# Patient Record
Sex: MALE | Race: WHITE | Employment: OTHER | URBAN - METROPOLITAN AREA
[De-identification: names, ages, dates, MRNs, and addresses within clinical notes are randomized per-mention and may not be internally consistent; named-entity substitution may affect disease eponyms.]

---

## 2017-03-08 ENCOUNTER — HOSPITAL ENCOUNTER (OUTPATIENT)
Dept: PHYSICAL THERAPY | Age: 72
Discharge: HOME | End: 2017-03-08
Payer: MEDICARE

## 2017-03-08 PROCEDURE — G8978 MOBILITY CURRENT STATUS: HCPCS

## 2017-03-08 PROCEDURE — G8979 MOBILITY GOAL STATUS: HCPCS

## 2017-03-08 PROCEDURE — 97162 PT EVAL MOD COMPLEX 30 MIN: CPT

## 2017-03-08 PROCEDURE — 97110 THERAPEUTIC EXERCISES: CPT

## 2017-03-08 NOTE — PROGRESS NOTES
Ambulatory/Rehab Services H2 Model Falls Risk Assessment    Risk Factor Pts. ·   Confusion/Disorientation/Impulsivity  []    4 ·   Symptomatic Depression  []   2 ·   Altered Elimination  []   1 ·   Dizziness/Vertigo  []   1 ·   Gender (Male)  [x]   1 ·   Any administered antiepileptics (anticonvulsants):  []   2 ·   Any administered benzodiazepines:  []   1 ·   Visual Impairment (specify):  []   1 ·   Portable Oxygen Use  []   1 ·   Orthostatic ? BP  []   1 ·   History of Recent Falls (within 3 mos.)  []   5     Ability to Rise from Chair (choose one) Pts. ·   Ability to rise in a single movement  [x]   0 ·   Pushes up, successful in one attempt  []   1 ·   Multiple attempts, but successful  []   3 ·   Unable to rise without assistance  []   4   Total: (5 or greater = High Risk) 1     Falls Prevention Plan:   []                Physical Limitations to Exercise (specify):   []                Mobility Assistance Device (type):   []                Exercise/Equipment Adaptation (specify):    ©2010 [facility] [place] Patent #[id number].  Federal Law prohibits the replication, distribution or use without written permission from [facility] [employer]

## 2017-03-08 NOTE — PROGRESS NOTES
Nicolas Denson. : 1945 [id number] [facility],[address]  [address] [place] [address]  [phone]   [phone]        OUTPATIENT PHYSICAL THERAPY:Initial Assessment 3/8/2017        ICD-10: Treatment Diagnosis: Pain in left hip (M25.552); Precautions/Allergies:   Tamsulosin   Fall Risk Score: 1 (? 5 = High Risk)  MD Orders: evaluate and treat MEDICAL/REFERRING DIAGNOSIS:  Pain in left hip [M25.552]   DATE OF ONSET: 2 month history  REFERRING PHYSICIAN: Vargas Piedra MD  RETURN PHYSICIAN APPOINTMENT: to be determined     INITIAL ASSESSMENT:  Mr. Bahman Pratt presents to therapy with left posterior hip pain secondary to insertional tendinitis of the hamstring musculature. He has good mobility of the hip joint and good flexibility of the hamstring musculature however weakness is present through the hamstring musculature contributing to difficulty with mobility, specifically with going up stairs. He also demonstrates weakness throughout the gluteal musculature leading to increased strain along the insertion of the hamstring musculature. Skilled therapy is required to return to prior level of function. PROBLEM LIST (Impacting functional limitations):  1. Decreased Strength  2. Decreased Ambulation Ability/Technique  3. Increased Pain  4. Decreased Activity Tolerance  5. Decreased Flexibility/Joint Mobility INTERVENTIONS PLANNED:  1. Balance Exercise  2. Cryotherapy  3. Heat  4. Home Exercise Program (HEP)  5. Manual Therapy  6. Neuromuscular Re-education/Strengthening  7. Range of Motion (ROM)  8. Therapeutic Activites  9. Therapeutic Exercise/Strengthening  10. modalities   TREATMENT PLAN:  Effective Dates: 17 TO 17. Frequency/Duration: 1 time a week for 4 weeks  GOALS: (Goals have been discussed and agreed upon with patient.)  Short-Term Functional Goals: Time Frame: 2 weeks  1. Patient will be independent with HEP.    2. Patient will be able to sit for 10 minutes without pain. Discharge Goals: Time Frame: 4 weeks  1. Patient will be able to go up 1 flight of stairs without symptoms. 2. Patient will demonstrate 5/5 hip extensor strength to improve pelvic stability and decrease strain at the hamstring musculature. 3. Patient will be able to sit for >30 minutes without pain to return to prior level of function. Rehabilitation Potential For Stated Goals: Excellent  Regarding Vandana Larsen JrNaa's therapy, I certify that the treatment plan above will be carried out by a therapist or under their direction. Thank you for this referral,  Lily Gonzalez DPT       Referring Physician Signature: Yael Piedra MD              Date                      HISTORY:   History of Present Injury/Illness (Reason for Referral):  Patient presents to therapy with 2 month history of left posterior hip pain along the insertion of the hamstring tendon. Denies any specific mechanism of injury, denies numbness or tingling. Does note loss of strength through the leg as symptoms are particularly irritated with going up stairs. He also notes pain with sitting >5 minutes having to frequently readjust to get comfortable. Denies pain with standing or walking and is able to walk indefinitely without issues. Also notes no difficulty with prone or supine lying. He has been taking Mobic over the past 3 days which have moderately relieved his pain. Has no difficulty with activities such as getting up and down from the floor or bending forward. Past Medical History/Comorbidities:   Mr. Radha Murcia  has a past medical history of Claustrophobia; COPD (2008); Elevated prostate specific antigen (PSA); Enlarged prostate; FHx: hypertension; GERD (gastroesophageal reflux disease); Hypertension; Hypertrophy of prostate with urinary obstruction and other lower urinary tract symptoms (LUTS);  Hypertrophy of prostate without urinary obstruction and other lower urinary tract symptoms (LUTS); and Obesity (BMI 30-39.9). Mr. Aimee Willis  has a past surgical history that includes cyst removal; bunionectomy; prostate biopsy, needle, saturation sampling; shoulder replacement (2010); shoulder arthroscopy; orthopaedic (2006); mohs procedure (Left); and refractive surgery. Social History/Living Environment:     Patient is retired and lives at home with his family. Prior Level of Function/Work/Activity:  Patient is retired. Dominant Side:         RIGHT  Current Medications:    Current Outpatient Prescriptions:     lisinopril (PRINIVIL, ZESTRIL) 10 mg tablet, Take 10 mg by mouth daily. , Disp: , Rfl:     HYDROcodone-acetaminophen (LORTAB) 0.5-33.3 mg/mL Soln oral solution, Take 15 mL by mouth every six (6) hours as needed. , Disp: 240 mL, Rfl: 0    omeprazole (PRILOSEC) 20 mg capsule, Take 20 mg by mouth every morning. Indications: GASTROESOPHAGEAL REFLUX, Disp: , Rfl:     lisinopril-hydrochlorothiazide (PRINZIDE, ZESTORETIC) 20-12.5 mg per tablet, Take 1 Tab by mouth every morning. Indications: HYPERTENSION, Disp: , Rfl:     dutasteride (AVODART) 0.5 mg capsule, Take 0.5 mg by mouth every morning., Disp: , Rfl:     albuterol (PROVENTIL HFA) 90 mcg/actuation inhaler, Take 1 Puff by inhalation as needed. Has not used in 1 year per pt- 8/19/13  Indications: CHRONIC OBSTRUCTIVE PULMONARY DISEASE, instructed to bring day of surgery, Disp: , Rfl:     ZINC MTH/COPPER/SAW PALM/GNSG (PROSTATE HEALTH FORMULA PO), Take 1 Tab by mouth every morning., Disp: , Rfl:     cholecalciferol, vitamin D3, (VITAMIN D3) 2,000 unit Tab, Take 1 Tab by mouth every morning., Disp: , Rfl:     coenzyme q10-vitamin e (COQ10 ) 100-100 mg-unit cap, Take 1 Tab by mouth every morning. Indications: held for surgery- last dose 8/19/13, Disp: , Rfl:     aspirin 81 mg Tab, Take 81 mg by mouth every morning.  Indications: preventative- held for surgery- last dose 8/19/13, Disp: , Rfl:     Glucosamine &Chondroit-MV-Min3 486-652-83-0.5 mg Tab, Take 1 Tab by mouth every morning. Indications: held for surgery- last dose 8/19/13, Disp: , Rfl:     omega-3 fatty acids-vitamin e (FISH OIL) 1,000 mg Cap, Take 1 Cap by mouth every morning. Indications: held for surgery- last dose 8/19/13, Disp: , Rfl:     ascorbic acid (VITAMIN C) 1,000 mg tablet, Take 1,000 mg by mouth every morning., Disp: , Rfl:     MULTIVITAMINS (MULTIVITAMIN PO), Take 1 Tab by mouth every morning. Indications: held for surgery- last dose 8/19/13, Disp: , Rfl:    Date Last Reviewed:  3/8/2017   # of Personal Factors/Comorbidities that affect the Plan of Care: 1-2: MODERATE COMPLEXITY   EXAMINATION:   Observation/Orthostatic Postural Assessment:          No gross deformity or atrophy is noted. General atrophy is present through the glutes bilaterally. Palpation:          Tender to hamstring tendon insertion. No tenderness to piriformis or lumbar spine  ROM:     Hip IR: 30 ° B with no provocation  Hip ER: 50 ° B  SLR: 90 ° B  90/90  Hamstring limited 20 ° B with no provocation  Piriformis length is normal  Hip extension: 10 ° B       Strength:     Knee extension: 5/5 B  Knee flexion: 4+/5 L; 5/5 R  Ankle DF: 5/5 B  Hip extension: 4-/5 L; 5/5 R  Glute strength: 4-/5 L; 5/5 R      Special Tests:          Neural tension is negative; Bridge with kick out is provocative to the L  Neurological Screen:        Grossly intact. Functional Mobility:         independent  Balance: WNL   Body Structures Involved:  1. Nerves  2. Bones  3. Joints  4. Muscles  5. Ligaments Body Functions Affected:  1. Sensory/Pain  2. Neuromusculoskeletal  3. Movement Related Activities and Participation Affected:  1. General Tasks and Demands  2. Mobility  3. Self Care  4. Domestic Life  5.  Interpersonal Interactions and Relationships   # of elements that affect the Plan of Care: 3: MODERATE COMPLEXITY   CLINICAL PRESENTATION:   Presentation: Evolving clinical presentation with changing clinical characteristics: MODERATE COMPLEXITY   CLINICAL DECISION MAKING:   Outcome Measure: Tool Used: Lower Extremity Functional Scale (LEFS)  Score:  Initial: 67/80 Most Recent: X/80 (Date: -- )   Interpretation of Score: 20 questions each scored on a 5 point scale with 0 representing \"extreme difficulty or unable to perform\" and 4 representing \"no difficulty\". The lower the score, the greater the functional disability. 80/80 represents no disability. Minimal detectable change is 9 points. Score 80 79-63 62-48 47-32 31-16 15-1 0   Modifier CH CI CJ CK CL CM CN     ? Mobility - Walking and Moving Around:     - CURRENT STATUS: CI - 1%-19% impaired, limited or restricted    - GOAL STATUS: CH - 0% impaired, limited or restricted    - D/C STATUS:  ---------------To be determined---------------    Medical Necessity:   · Patient is expected to demonstrate progress in strength, range of motion, balance and coordination to increase independence with community mobility and return to prior level of function. Reason for Services/Other Comments:  · Patient has demonstrated an improvement in functional level by independent performance of HEP. Use of outcome tool(s) and clinical judgement create a POC that gives a: Questionable prediction of patient's progress: MODERATE COMPLEXITY   TREATMENT:   (In addition to Assessment/Re-Assessment sessions the following treatments were rendered)  THERAPEUTIC EXERCISE: (see flow sheet below for minutes):  Exercises per grid below to improve mobility, strength, balance and coordination. Required minimal verbal and manual cues to promote proper body alignment, promote proper body posture and promote proper body mechanics. Progressed resistance, range, repetitions and complexity of movement as indicated.   MANUAL THERAPY: (see flow sheet below for minutes): Joint mobilization and Soft tissue mobilization was utilized and necessary because of the patient's restricted joint motion, painful spasm, loss of articular motion and restricted motion of soft tissue. MODALITIES: (see flow sheet below for minutes):      to decrease pain     Date: 03/08/17       Modalities:                                Therapeutic Exercise: 15 min       Supine hamstring stretch 90/90 active isolated [id number]       Glute bridge [id number]       Clamshell 3x10       Sidelying hip abduction 3x10                       Proprioceptive Activities:                                Manual Therapy:                        Functional Activities:                                        HEP: see 03/08/17 flow sheet for specifics. Treatment/Session Assessment:  Patient is independent with performance of above described home program.    · Pain/ Symptoms: Initial:   8/10 Post Session:  No increase following today's treatment session. ·   Compliance with Program/Exercises: Will assess as treatment progresses. · Recommendations/Intent for next treatment session: \"Next visit will focus on advancements to more challenging activities\".   Total Treatment Duration:  PT Patient Time In/Time Out  Time In: 0845  Time Out: 915 [facility]

## 2017-03-16 ENCOUNTER — HOSPITAL ENCOUNTER (OUTPATIENT)
Dept: PHYSICAL THERAPY | Age: 72
Discharge: HOME | End: 2017-03-16
Payer: MEDICARE

## 2017-03-16 PROCEDURE — 97110 THERAPEUTIC EXERCISES: CPT

## 2017-03-16 NOTE — PROGRESS NOTES
Miri Sprain. : 1945 [address] [address]  [phone]   [id number]        OUTPATIENT PHYSICAL THERAPY:Daily Note 3/16/2017        ICD-10: Treatment Diagnosis: Pain in left hip (M25.552); Precautions/Allergies:   Tamsulosin   Fall Risk Score: 1 (? 5 = High Risk)  MD Orders: evaluate and treat MEDICAL/REFERRING DIAGNOSIS:  Pain in left hip [M25.552]   DATE OF ONSET: 2 month history  REFERRING PHYSICIAN: Ernesto Piedra MD  RETURN PHYSICIAN APPOINTMENT: to be determined     INITIAL ASSESSMENT:  Mr. Almita Minor presents to therapy with left posterior hip pain secondary to insertional tendinitis of the hamstring musculature. He has good mobility of the hip joint and good flexibility of the hamstring musculature however weakness is present through the hamstring musculature contributing to difficulty with mobility, specifically with going up stairs. He also demonstrates weakness throughout the gluteal musculature leading to increased strain along the insertion of the hamstring musculature. Skilled therapy is required to return to prior level of function. PROBLEM LIST (Impacting functional limitations):  1. Decreased Strength  2. Decreased Ambulation Ability/Technique  3. Increased Pain  4. Decreased Activity Tolerance  5. Decreased Flexibility/Joint Mobility INTERVENTIONS PLANNED:  1. Balance Exercise  2. Cryotherapy  3. Heat  4. Home Exercise Program (HEP)  5. Manual Therapy  6. Neuromuscular Re-education/Strengthening  7. Range of Motion (ROM)  8. Therapeutic Activites  9. Therapeutic Exercise/Strengthening  10. modalities   TREATMENT PLAN:  Effective Dates: 17 TO 17. Frequency/Duration: 1 time a week for 4 weeks  GOALS: (Goals have been discussed and agreed upon with patient.)  Short-Term Functional Goals: Time Frame: 2 weeks  1. Patient will be independent with HEP.    2. Patient will be able to sit for 10 minutes without pain. Discharge Goals: Time Frame: 4 weeks  1. Patient will be able to go up 1 flight of stairs without symptoms. 2. Patient will demonstrate 5/5 hip extensor strength to improve pelvic stability and decrease strain at the hamstring musculature. 3. Patient will be able to sit for >30 minutes without pain to return to prior level of function. Rehabilitation Potential For Stated Goals: Excellent              HISTORY:   History of Present Injury/Illness (Reason for Referral):  Patient presents to therapy with 2 month history of left posterior hip pain along the insertion of the hamstring tendon. Denies any specific mechanism of injury, denies numbness or tingling. Does note loss of strength through the leg as symptoms are particularly irritated with going up stairs. He also notes pain with sitting >5 minutes having to frequently readjust to get comfortable. Denies pain with standing or walking and is able to walk indefinitely without issues. Also notes no difficulty with prone or supine lying. He has been taking Mobic over the past 3 days which have moderately relieved his pain. Has no difficulty with activities such as getting up and down from the floor or bending forward. Past Medical History/Comorbidities:   Mr. Raymond Chun  has a past medical history of Claustrophobia; COPD (2008); Elevated prostate specific antigen (PSA); Enlarged prostate; FHx: hypertension; GERD (gastroesophageal reflux disease); Hypertension; Hypertrophy of prostate with urinary obstruction and other lower urinary tract symptoms (LUTS); Hypertrophy of prostate without urinary obstruction and other lower urinary tract symptoms (LUTS); and Obesity (BMI 30-39.9). Mr. Raymond Chun  has a past surgical history that includes cyst removal; bunionectomy; prostate biopsy, needle, saturation sampling; shoulder replacement (2010); shoulder arthroscopy; orthopaedic (2006); mohs procedure (Left); and refractive surgery.   Social History/Living Environment:     Patient is retired and lives at home with his family. Prior Level of Function/Work/Activity:  Patient is retired. Dominant Side:         RIGHT  Current Medications:    Current Outpatient Prescriptions:     lisinopril (PRINIVIL, ZESTRIL) 10 mg tablet, Take 10 mg by mouth daily. , Disp: , Rfl:     HYDROcodone-acetaminophen (LORTAB) 0.5-33.3 mg/mL Soln oral solution, Take 15 mL by mouth every six (6) hours as needed. , Disp: 240 mL, Rfl: 0    omeprazole (PRILOSEC) 20 mg capsule, Take 20 mg by mouth every morning. Indications: GASTROESOPHAGEAL REFLUX, Disp: , Rfl:     lisinopril-hydrochlorothiazide (PRINZIDE, ZESTORETIC) 20-12.5 mg per tablet, Take 1 Tab by mouth every morning. Indications: HYPERTENSION, Disp: , Rfl:     dutasteride (AVODART) 0.5 mg capsule, Take 0.5 mg by mouth every morning., Disp: , Rfl:     albuterol (PROVENTIL HFA) 90 mcg/actuation inhaler, Take 1 Puff by inhalation as needed. Has not used in 1 year per pt- 8/19/13  Indications: CHRONIC OBSTRUCTIVE PULMONARY DISEASE, instructed to bring day of surgery, Disp: , Rfl:     ZINC MTH/COPPER/SAW PALM/GNSG (PROSTATE HEALTH FORMULA PO), Take 1 Tab by mouth every morning., Disp: , Rfl:     cholecalciferol, vitamin D3, (VITAMIN D3) 2,000 unit Tab, Take 1 Tab by mouth every morning., Disp: , Rfl:     coenzyme q10-vitamin e (COQ10 ) 100-100 mg-unit cap, Take 1 Tab by mouth every morning. Indications: held for surgery- last dose 8/19/13, Disp: , Rfl:     aspirin 81 mg Tab, Take 81 mg by mouth every morning. Indications: preventative- held for surgery- last dose 8/19/13, Disp: , Rfl:     Glucosamine &Chondroit-MV-Min3 698-860-96-0.5 mg Tab, Take 1 Tab by mouth every morning. Indications: held for surgery- last dose 8/19/13, Disp: , Rfl:     omega-3 fatty acids-vitamin e (FISH OIL) 1,000 mg Cap, Take 1 Cap by mouth every morning.  Indications: held for surgery- last dose 8/19/13, Disp: , Rfl:     ascorbic acid (VITAMIN C) 1,000 mg tablet, Take 1,000 mg by mouth every morning., Disp: , Rfl:     MULTIVITAMINS (MULTIVITAMIN PO), Take 1 Tab by mouth every morning. Indications: held for surgery- last dose 8/19/13, Disp: , Rfl:    Date Last Reviewed:  3/16/2017   EXAMINATION:   Observation/Orthostatic Postural Assessment:          No gross deformity or atrophy is noted. General atrophy is present through the glutes bilaterally. Palpation:          Tender to hamstring tendon insertion. No tenderness to piriformis or lumbar spine  ROM:     Hip IR: 30 ° B with no provocation  Hip ER: 50 ° B  SLR: 90 ° B  90/90  Hamstring limited 20 ° B with no provocation  Piriformis length is normal  Hip extension: 10 ° B       Strength:     Knee extension: 5/5 B  Knee flexion: 4+/5 L; 5/5 R  Ankle DF: 5/5 B  Hip extension: 4-/5 L; 5/5 R  Glute strength: 4-/5 L; 5/5 R      Special Tests:          Neural tension is negative; Bridge with kick out is provocative to the L  Neurological Screen:        Grossly intact. Functional Mobility:         independent  Balance: WNL   Body Structures Involved:  1. Nerves  2. Bones  3. Joints  4. Muscles  5. Ligaments Body Functions Affected:  1. Sensory/Pain  2. Neuromusculoskeletal  3. Movement Related Activities and Participation Affected:  1. General Tasks and Demands  2. Mobility  3. Self Care  4. Domestic Life  5. Interpersonal Interactions and Relationships   CLINICAL PRESENTATION:   CLINICAL DECISION MAKING:   Outcome Measure: Tool Used: Lower Extremity Functional Scale (LEFS)  Score:  Initial: 67/80 Most Recent: X/80 (Date: -- )   Interpretation of Score: 20 questions each scored on a 5 point scale with 0 representing \"extreme difficulty or unable to perform\" and 4 representing \"no difficulty\". The lower the score, the greater the functional disability. 80/80 represents no disability. Minimal detectable change is 9 points.   Score 80 79-63 62-48 47-32 31-16 15-1 0   Modifier CH CI CJ CK CL CM CN     ? Mobility - Walking and Moving Around:     - CURRENT STATUS: CI - 1%-19% impaired, limited or restricted    - GOAL STATUS: CH - 0% impaired, limited or restricted    - D/C STATUS:  ---------------To be determined---------------    Medical Necessity:   · Patient is expected to demonstrate progress in strength, range of motion, balance and coordination to increase independence with community mobility and return to prior level of function. Reason for Services/Other Comments:  · Patient has demonstrated an improvement in functional level by independent performance of HEP. TREATMENT:   (In addition to Assessment/Re-Assessment sessions the following treatments were rendered)  THERAPEUTIC EXERCISE: (see flow sheet below for minutes):  Exercises per grid below to improve mobility, strength, balance and coordination. Required minimal verbal and manual cues to promote proper body alignment, promote proper body posture and promote proper body mechanics. Progressed resistance, range, repetitions and complexity of movement as indicated. MANUAL THERAPY: (see flow sheet below for minutes): Joint mobilization and Soft tissue mobilization was utilized and necessary because of the patient's restricted joint motion, painful spasm, loss of articular motion and restricted motion of soft tissue.    MODALITIES: (see flow sheet below for minutes):      to decrease pain     Date: 03/08/17 03/16/17 (visit 2)      Modalities:                                Therapeutic Exercise: 15 min 40 min      Supine hamstring stretch 90/90 active isolated [id number] 90/90 active isolated [id number]; SLR and ITB [id number] each; hip flexor stretch 3x1 min      Glute bridge [id number] 3x10 with 5sec hold      Clamshell 3x10 2x15      Sidelying hip abduction 3x10       Bridge  With heels on SB 2x15      Lateral band walks  60# x3 laps                                              Proprioceptive Activities: Manual Therapy:                        Functional Activities:                                        HEP: see 03/08/17 flow sheet for specifics. Treatment/Session Assessment:  Continues to demonstrate weakness through glutes. Symptom irritability is decreasing as noted by increased tolerance to sitting. · Pain/ Symptoms: Initial:   5/10 \"It's been doing better. I still have some discomfort with sitting and it still hurts to go up stairs a bit but overall it's getting better. \" Post Session:  No increase following today's treatment session. ·   Compliance with Program/Exercises: Will assess as treatment progresses. · Recommendations/Intent for next treatment session: \"Next visit will focus on advancements to more challenging activities\".   Total Treatment Duration:  PT Patient Time In/Time Out  Time In: 1020  Time Out: 632 [facility], [facility]

## 2017-03-17 ENCOUNTER — APPOINTMENT (OUTPATIENT)
Dept: PHYSICAL THERAPY | Age: 72
End: 2017-03-17
Payer: MEDICARE

## 2017-03-22 ENCOUNTER — HOSPITAL ENCOUNTER (OUTPATIENT)
Dept: PHYSICAL THERAPY | Age: 72
Discharge: HOME | End: 2017-03-22
Payer: MEDICARE

## 2017-03-22 NOTE — PROGRESS NOTES
Patient no showed to visit. He was given a courtesy call to remind him of his next visit. Recommend con't with POC at next visit.

## 2017-03-24 ENCOUNTER — HOSPITAL ENCOUNTER (OUTPATIENT)
Dept: PHYSICAL THERAPY | Age: 72
Discharge: HOME | End: 2017-03-24
Payer: MEDICARE

## 2017-03-24 PROCEDURE — 97110 THERAPEUTIC EXERCISES: CPT

## 2017-03-24 NOTE — PROGRESS NOTES
Chester Gil. : 1945 [id number] [facility],[address]  [address] [place] [address]  [phone]   [phone]        OUTPATIENT PHYSICAL THERAPY:Daily Note 3/24/2017        ICD-10: Treatment Diagnosis: Pain in left hip (M25.552); Precautions/Allergies:   Tamsulosin   Fall Risk Score: 1 (? 5 = High Risk)  MD Orders: evaluate and treat MEDICAL/REFERRING DIAGNOSIS:  Pain in left hip [M25.552]   DATE OF ONSET: 2 month history  REFERRING PHYSICIAN: Isreal Piedra MD  RETURN PHYSICIAN APPOINTMENT: to be determined     INITIAL ASSESSMENT:  Mr. Cora Johnson presents to therapy with left posterior hip pain secondary to insertional tendinitis of the hamstring musculature. He has good mobility of the hip joint and good flexibility of the hamstring musculature however weakness is present through the hamstring musculature contributing to difficulty with mobility, specifically with going up stairs. He also demonstrates weakness throughout the gluteal musculature leading to increased strain along the insertion of the hamstring musculature. Skilled therapy is required to return to prior level of function. PROBLEM LIST (Impacting functional limitations):  1. Decreased Strength  2. Decreased Ambulation Ability/Technique  3. Increased Pain  4. Decreased Activity Tolerance  5. Decreased Flexibility/Joint Mobility INTERVENTIONS PLANNED:  1. Balance Exercise  2. Cryotherapy  3. Heat  4. Home Exercise Program (HEP)  5. Manual Therapy  6. Neuromuscular Re-education/Strengthening  7. Range of Motion (ROM)  8. Therapeutic Activites  9. Therapeutic Exercise/Strengthening  10. modalities   TREATMENT PLAN:  Effective Dates: 17 TO 17. Frequency/Duration: 1 time a week for 4 weeks  GOALS: (Goals have been discussed and agreed upon with patient.)  Short-Term Functional Goals: Time Frame: 2 weeks  1. Patient will be independent with HEP.    2. Patient will be able to sit for 10 minutes without pain. Discharge Goals: Time Frame: 4 weeks  1. Patient will be able to go up 1 flight of stairs without symptoms. 2. Patient will demonstrate 5/5 hip extensor strength to improve pelvic stability and decrease strain at the hamstring musculature. 3. Patient will be able to sit for >30 minutes without pain to return to prior level of function. Rehabilitation Potential For Stated Goals: Excellent              HISTORY:   History of Present Injury/Illness (Reason for Referral):  Patient presents to therapy with 2 month history of left posterior hip pain along the insertion of the hamstring tendon. Denies any specific mechanism of injury, denies numbness or tingling. Does note loss of strength through the leg as symptoms are particularly irritated with going up stairs. He also notes pain with sitting >5 minutes having to frequently readjust to get comfortable. Denies pain with standing or walking and is able to walk indefinitely without issues. Also notes no difficulty with prone or supine lying. He has been taking Mobic over the past 3 days which have moderately relieved his pain. Has no difficulty with activities such as getting up and down from the floor or bending forward. Past Medical History/Comorbidities:   Mr. Santino Bolaños  has a past medical history of Claustrophobia; COPD (2008); Elevated prostate specific antigen (PSA); Enlarged prostate; FHx: hypertension; GERD (gastroesophageal reflux disease); Hypertension; Hypertrophy of prostate with urinary obstruction and other lower urinary tract symptoms (LUTS); Hypertrophy of prostate without urinary obstruction and other lower urinary tract symptoms (LUTS); and Obesity (BMI 30-39.9). Mr. Santino Bolaños  has a past surgical history that includes cyst removal; bunionectomy; prostate biopsy, needle, saturation sampling; shoulder replacement (2010); shoulder arthroscopy; orthopaedic (2006); mohs procedure (Left); and refractive surgery.   Social History/Living Environment:     Patient is retired and lives at home with his family. Prior Level of Function/Work/Activity:  Patient is retired. Dominant Side:         RIGHT  Current Medications:    Current Outpatient Prescriptions:     lisinopril (PRINIVIL, ZESTRIL) 10 mg tablet, Take 10 mg by mouth daily. , Disp: , Rfl:     HYDROcodone-acetaminophen (LORTAB) 0.5-33.3 mg/mL Soln oral solution, Take 15 mL by mouth every six (6) hours as needed. , Disp: 240 mL, Rfl: 0    omeprazole (PRILOSEC) 20 mg capsule, Take 20 mg by mouth every morning. Indications: GASTROESOPHAGEAL REFLUX, Disp: , Rfl:     lisinopril-hydrochlorothiazide (PRINZIDE, ZESTORETIC) 20-12.5 mg per tablet, Take 1 Tab by mouth every morning. Indications: HYPERTENSION, Disp: , Rfl:     dutasteride (AVODART) 0.5 mg capsule, Take 0.5 mg by mouth every morning., Disp: , Rfl:     albuterol (PROVENTIL HFA) 90 mcg/actuation inhaler, Take 1 Puff by inhalation as needed. Has not used in 1 year per pt- 8/19/13  Indications: CHRONIC OBSTRUCTIVE PULMONARY DISEASE, instructed to bring day of surgery, Disp: , Rfl:     ZINC MTH/COPPER/SAW PALM/GNSG (PROSTATE HEALTH FORMULA PO), Take 1 Tab by mouth every morning., Disp: , Rfl:     cholecalciferol, vitamin D3, (VITAMIN D3) 2,000 unit Tab, Take 1 Tab by mouth every morning., Disp: , Rfl:     coenzyme q10-vitamin e (COQ10 ) 100-100 mg-unit cap, Take 1 Tab by mouth every morning. Indications: held for surgery- last dose 8/19/13, Disp: , Rfl:     aspirin 81 mg Tab, Take 81 mg by mouth every morning. Indications: preventative- held for surgery- last dose 8/19/13, Disp: , Rfl:     Glucosamine &Chondroit-MV-Min3 875-266-26-0.5 mg Tab, Take 1 Tab by mouth every morning. Indications: held for surgery- last dose 8/19/13, Disp: , Rfl:     omega-3 fatty acids-vitamin e (FISH OIL) 1,000 mg Cap, Take 1 Cap by mouth every morning.  Indications: held for surgery- last dose 8/19/13, Disp: , Rfl:     ascorbic acid (VITAMIN C) 1,000 mg tablet, Take 1,000 mg by mouth every morning., Disp: , Rfl:     MULTIVITAMINS (MULTIVITAMIN PO), Take 1 Tab by mouth every morning. Indications: held for surgery- last dose 8/19/13, Disp: , Rfl:    Date Last Reviewed:  3/24/2017   EXAMINATION:   Observation/Orthostatic Postural Assessment:          No gross deformity or atrophy is noted. General atrophy is present through the glutes bilaterally. Palpation:          Tender to hamstring tendon insertion. No tenderness to piriformis or lumbar spine  ROM:     Hip IR: 30 ° B with no provocation  Hip ER: 50 ° B  SLR: 90 ° B  90/90  Hamstring limited 20 ° B with no provocation  Piriformis length is normal  Hip extension: 10 ° B       Strength:     Knee extension: 5/5 B  Knee flexion: 4+/5 L; 5/5 R  Ankle DF: 5/5 B  Hip extension: 4-/5 L; 5/5 R  Glute strength: 4-/5 L; 5/5 R      Special Tests:          Neural tension is negative; Bridge with kick out is provocative to the L  Neurological Screen:        Grossly intact. Functional Mobility:         independent  Balance: WNL   Body Structures Involved:  1. Nerves  2. Bones  3. Joints  4. Muscles  5. Ligaments Body Functions Affected:  1. Sensory/Pain  2. Neuromusculoskeletal  3. Movement Related Activities and Participation Affected:  1. General Tasks and Demands  2. Mobility  3. Self Care  4. Domestic Life  5. Interpersonal Interactions and Relationships   CLINICAL PRESENTATION:   CLINICAL DECISION MAKING:   Outcome Measure: Tool Used: Lower Extremity Functional Scale (LEFS)  Score:  Initial: 67/80 Most Recent: X/80 (Date: -- )   Interpretation of Score: 20 questions each scored on a 5 point scale with 0 representing \"extreme difficulty or unable to perform\" and 4 representing \"no difficulty\". The lower the score, the greater the functional disability. 80/80 represents no disability. Minimal detectable change is 9 points.   Score 80 79-63 62-48 47-32 31-16 15-1 0   Modifier CH CI CJ CK CL CM CN     ? Mobility - Walking and Moving Around:     - CURRENT STATUS: CI - 1%-19% impaired, limited or restricted    - GOAL STATUS: CH - 0% impaired, limited or restricted    - D/C STATUS:  ---------------To be determined---------------    Medical Necessity:   · Patient is expected to demonstrate progress in strength, range of motion, balance and coordination to increase independence with community mobility and return to prior level of function. Reason for Services/Other Comments:  · Patient has demonstrated an improvement in functional level by independent performance of HEP. TREATMENT:   (In addition to Assessment/Re-Assessment sessions the following treatments were rendered)  THERAPEUTIC EXERCISE: (see flow sheet below for minutes):  Exercises per grid below to improve mobility, strength, balance and coordination. Required minimal verbal and manual cues to promote proper body alignment, promote proper body posture and promote proper body mechanics. Progressed resistance, range, repetitions and complexity of movement as indicated. MANUAL THERAPY: (see flow sheet below for minutes): Joint mobilization and Soft tissue mobilization was utilized and necessary because of the patient's restricted joint motion, painful spasm, loss of articular motion and restricted motion of soft tissue.    MODALITIES: (see flow sheet below for minutes):      to decrease pain     Date: 03/08/17 03/16/17 (visit 2) 03/24/17 (visit 3)     Modalities:                                Therapeutic Exercise: 15 min 40 min 40 min     Supine hamstring stretch 90/90 active isolated [id number] 90/90 active isolated [id number]; SLR and ITB [id number] each; hip flexor stretch 3x1 min repeat     Glute bridge [id number] 3x10 with 5sec hold 3x10 with 5sec hold     Clamshell 3x10 2x15 2x15     Sidelying hip abduction 3x10  2x15     Bridge  With heels on SB 2x15 repeat     Lateral band walks  60# x3 laps repeat     Lacrosse ball mobilization Sitting with ball under hamstrings 5 min total                                     Proprioceptive Activities:                                Manual Therapy:                        Functional Activities:                                        HEP: see 03/08/17 flow sheet for specifics. Treatment/Session Assessment:  Minimal tenderness noted with self mobilization using lacrosse ball. Has slight symptom provocation with glute bridge with knees extended. · Pain/ Symptoms: Initial:   5/10 \"It's been a bit more sore, but I've stopped taking the medication. It's definitely not as irritated as it was. \" Post Session:  No increase following today's treatment session. ·   Compliance with Program/Exercises: Will assess as treatment progresses. · Recommendations/Intent for next treatment session: \"Next visit will focus on advancements to more challenging activities\".   Total Treatment Duration:  PT Patient Time In/Time Out  Time In: 0930  Time Out: 5[address] [facility]

## 2017-03-29 ENCOUNTER — APPOINTMENT (OUTPATIENT)
Dept: PHYSICAL THERAPY | Age: 72
End: 2017-03-29
Payer: MEDICARE

## 2017-03-30 NOTE — PROGRESS NOTES
Jammiezaid Mahoney : 1945 [address] [place] [address],[address]  [address] [place] [address]  [phone]   [phone]        OUTPATIENT PHYSICAL THERAPY:Daily Note 3/31/2017        ICD-10: Treatment Diagnosis: Pain in left hip (M25.552); Precautions/Allergies:   Tamsulosin   Fall Risk Score: 1 (? 5 = High Risk)  MD Orders: evaluate and treat MEDICAL/REFERRING DIAGNOSIS:  Pain in left hip [M25.552]   DATE OF ONSET: 2 month history  REFERRING PHYSICIAN: Nora Piedra MD  RETURN PHYSICIAN APPOINTMENT: to be determined     INITIAL ASSESSMENT:  Mr. Aydin Kelly presents to therapy with left posterior hip pain secondary to insertional tendinitis of the hamstring musculature. He has good mobility of the hip joint and good flexibility of the hamstring musculature however weakness is present through the hamstring musculature contributing to difficulty with mobility, specifically with going up stairs. He also demonstrates weakness throughout the gluteal musculature leading to increased strain along the insertion of the hamstring musculature. Skilled therapy is required to return to prior level of function. PROBLEM LIST (Impacting functional limitations):  1. Decreased Strength  2. Decreased Ambulation Ability/Technique  3. Increased Pain  4. Decreased Activity Tolerance  5. Decreased Flexibility/Joint Mobility INTERVENTIONS PLANNED:  1. Balance Exercise  2. Cryotherapy  3. Heat  4. Home Exercise Program (HEP)  5. Manual Therapy  6. Neuromuscular Re-education/Strengthening  7. Range of Motion (ROM)  8. Therapeutic Activites  9. Therapeutic Exercise/Strengthening  10. modalities   TREATMENT PLAN:  Effective Dates: 17 TO 17. Frequency/Duration: 1 time a week for 4 weeks  GOALS: (Goals have been discussed and agreed upon with patient.)  Short-Term Functional Goals: Time Frame: 2 weeks  1. Patient will be independent with HEP. (MET)  2.  Patient will be able to sit for 10 minutes without pain.(MET)    Discharge Goals: Time Frame: 4 weeks  1. Patient will be able to go up 1 flight of stairs without symptoms. (progressing)  2. Patient will demonstrate 5/5 hip extensor strength to improve pelvic stability and decrease strain at the hamstring musculature. (progressing)  3. Patient will be able to sit for >30 minutes without pain to return to prior level of function.(progressing, he still has pain with sitting at [employer])  Rehabilitation Potential For Stated Goals: Excellent              HISTORY:   History of Present Injury/Illness (Reason for Referral):  Patient presents to therapy with 2 month history of left posterior hip pain along the insertion of the hamstring tendon. Denies any specific mechanism of injury, denies numbness or tingling. Does note loss of strength through the leg as symptoms are particularly irritated with going up stairs. He also notes pain with sitting >5 minutes having to frequently readjust to get comfortable. Denies pain with standing or walking and is able to walk indefinitely without issues. Also notes no difficulty with prone or supine lying. He has been taking Mobic over the past 3 days which have moderately relieved his pain. Has no difficulty with activities such as getting up and down from the floor or bending forward. Past Medical History/Comorbidities:   Mr. Samantha Correa  has a past medical history of Claustrophobia; COPD (2008); Elevated prostate specific antigen (PSA); Enlarged prostate; FHx: hypertension; GERD (gastroesophageal reflux disease); Hypertension; Hypertrophy of prostate with urinary obstruction and other lower urinary tract symptoms (LUTS); Hypertrophy of prostate without urinary obstruction and other lower urinary tract symptoms (LUTS); and Obesity (BMI 30-39.9).   Mr. Samantha Correa  has a past surgical history that includes cyst removal; bunionectomy; prostate biopsy, needle, saturation sampling; shoulder replacement (2010); shoulder arthroscopy; orthopaedic (2006); mohs procedure (Left); and refractive surgery. Social History/Living Environment:     Patient is retired and lives at home with his family. Prior Level of Function/Work/Activity:  Patient is retired. Dominant Side:         RIGHT  Current Medications:    Current Outpatient Prescriptions:     lisinopril (PRINIVIL, ZESTRIL) 10 mg tablet, Take 10 mg by mouth daily. , Disp: , Rfl:     HYDROcodone-acetaminophen (LORTAB) 0.5-33.3 mg/mL Soln oral solution, Take 15 mL by mouth every six (6) hours as needed. , Disp: 240 mL, Rfl: 0    omeprazole (PRILOSEC) 20 mg capsule, Take 20 mg by mouth every morning. Indications: GASTROESOPHAGEAL REFLUX, Disp: , Rfl:     lisinopril-hydrochlorothiazide (PRINZIDE, ZESTORETIC) 20-12.5 mg per tablet, Take 1 Tab by mouth every morning. Indications: HYPERTENSION, Disp: , Rfl:     dutasteride (AVODART) 0.5 mg capsule, Take 0.5 mg by mouth every morning., Disp: , Rfl:     albuterol (PROVENTIL HFA) 90 mcg/actuation inhaler, Take 1 Puff by inhalation as needed. Has not used in 1 year per pt- 8/19/13  Indications: CHRONIC OBSTRUCTIVE PULMONARY DISEASE, instructed to bring day of surgery, Disp: , Rfl:     ZINC MTH/COPPER/SAW PALM/GNSG (PROSTATE HEALTH FORMULA PO), Take 1 Tab by mouth every morning., Disp: , Rfl:     cholecalciferol, vitamin D3, (VITAMIN D3) 2,000 unit Tab, Take 1 Tab by mouth every morning., Disp: , Rfl:     coenzyme q10-vitamin e (COQ10 ) 100-100 mg-unit cap, Take 1 Tab by mouth every morning. Indications: held for surgery- last dose 8/19/13, Disp: , Rfl:     aspirin 81 mg Tab, Take 81 mg by mouth every morning. Indications: preventative- held for surgery- last dose 8/19/13, Disp: , Rfl:     Glucosamine &Chondroit-MV-Min3 365-647-88-0.5 mg Tab, Take 1 Tab by mouth every morning.  Indications: held for surgery- last dose 8/19/13, Disp: , Rfl:     omega-3 fatty acids-vitamin e (FISH OIL) 1,000 mg Cap, Take 1 Cap by mouth every morning. Indications: held for surgery- last dose 8/19/13, Disp: , Rfl:     ascorbic acid (VITAMIN C) 1,000 mg tablet, Take 1,000 mg by mouth every morning., Disp: , Rfl:     MULTIVITAMINS (MULTIVITAMIN PO), Take 1 Tab by mouth every morning. Indications: held for surgery- last dose 8/19/13, Disp: , Rfl:    Date Last Reviewed:  3/31/2017   EXAMINATION:   Observation/Orthostatic Postural Assessment:          No gross deformity or atrophy is noted. General atrophy is present through the glutes bilaterally. Palpation:          Tender to hamstring tendon insertion. No tenderness to piriformis or lumbar spine  ROM:     Hip IR: 30 ° B with no provocation  Hip ER: 50 ° B  SLR: 90 ° B  90/90  Hamstring limited 20 ° B with no provocation  Piriformis length is normal  Hip extension: 10 ° B       Strength:     Knee extension: 5/5 B  Knee flexion: 4+/5 L; 5/5 R  Ankle DF: 5/5 B  Hip extension: 4-/5 L; 5/5 R  Glute strength: 4-/5 L; 5/5 R      Special Tests:          Neural tension is negative; Bridge with kick out is provocative to the L  Neurological Screen:        Grossly intact. Functional Mobility:         independent  Balance: WNL   Body Structures Involved:  1. Nerves  2. Bones  3. Joints  4. Muscles  5. Ligaments Body Functions Affected:  1. Sensory/Pain  2. Neuromusculoskeletal  3. Movement Related Activities and Participation Affected:  1. General Tasks and Demands  2. Mobility  3. Self Care  4. Domestic Life  5. Interpersonal Interactions and Relationships   CLINICAL PRESENTATION:   CLINICAL DECISION MAKING:   Outcome Measure: Tool Used: Lower Extremity Functional Scale (LEFS)  Score:  Initial: 67/80 Most Recent: X/80 (Date: -- )   Interpretation of Score: 20 questions each scored on a 5 point scale with 0 representing \"extreme difficulty or unable to perform\" and 4 representing \"no difficulty\". The lower the score, the greater the functional disability. 80/80 represents no disability.   Minimal detectable change is 9 points. Score 80 79-63 62-48 47-32 31-16 15-1 0   Modifier CH CI CJ CK CL CM CN     ? Mobility - Walking and Moving Around:     - CURRENT STATUS: CI - 1%-19% impaired, limited or restricted    - GOAL STATUS: CH - 0% impaired, limited or restricted    - D/C STATUS:  ---------------To be determined---------------    Medical Necessity:   · Patient is expected to demonstrate progress in strength, range of motion, balance and coordination to increase independence with community mobility and return to prior level of function. Reason for Services/Other Comments:  · Patient has demonstrated an improvement in functional level by independent performance of HEP. TREATMENT:   (In addition to Assessment/Re-Assessment sessions the following treatments were rendered)  THERAPEUTIC EXERCISE: (see flow sheet below for minutes):  Exercises per grid below to improve mobility, strength, balance and coordination. Required minimal verbal and manual cues to promote proper body alignment, promote proper body posture and promote proper body mechanics. Progressed resistance, range, repetitions and complexity of movement as indicated. MANUAL THERAPY: (see flow sheet below for minutes): Joint mobilization and Soft tissue mobilization was utilized and necessary because of the patient's restricted joint motion, painful spasm, loss of articular motion and restricted motion of soft tissue.    MODALITIES: (see flow sheet below for minutes):      to decrease pain     Date: 03/08/17 03/16/17 (visit 2) 03/24/17 (visit 3) 3/31/2017  (visit 4)    Modalities:                                Therapeutic Exercise: 15 min 40 min 40 min 38 mins    Supine hamstring stretch 90/90 active isolated [id number] 90/90 active isolated [id number]; SLR and ITB [id number] each; hip flexor stretch 3x1 min repeat repeated    Glute bridge [id number] 3x10 with 5sec hold 3x10 with 5sec hold 3x10    Clamshell 3x10 2x15 2x15 RTB 2x15 B Sidelying hip abduction 3x10  2x15 2x15     Bridge  With heels on SB 2x15 repeat SB 3x10    Lateral band walks  60# x3 laps repeat 60# 3 laps    Lacrosse ball mobilization   Sitting with ball under hamstrings 5 min total Continue next visit     STS with RTB    3x10    Tandem balance foam    2x30 sec B    SLB    30 sec B    Step ups and lateral step overs    10x each    Proprioceptive Activities:                                Manual Therapy:    15 mins    STM post gltueal musculature and hamstrings    left            Functional Activities:                                        HEP: see 03/08/17 flow sheet for specifics. 3/31/201 HEP updated with RTB for clamshells and STS  Treatment/Session Assessment:  He tolerated session without c/o of pain. · Pain/ Symptoms: Initial:   0/10 \"My pain is getting better when I sit at [employer] but I only have pain when I sit at [employer]\" Post Session:  No increase following today's treatment session. ·   Compliance with Program/Exercises: Will assess as treatment progresses. · Recommendations/Intent for next treatment session: \"Next visit will focus on advancements to more challenging activities\".   Total Treatment Duration:  PT Patient Time In/Time Out  Time In: 0930  Time Out: [address], PT

## 2017-03-31 ENCOUNTER — HOSPITAL ENCOUNTER (OUTPATIENT)
Dept: PHYSICAL THERAPY | Age: 72
Discharge: HOME | End: 2017-03-31
Payer: MEDICARE

## 2017-03-31 PROCEDURE — 97110 THERAPEUTIC EXERCISES: CPT

## 2017-03-31 PROCEDURE — 97140 MANUAL THERAPY 1/> REGIONS: CPT

## 2017-05-25 NOTE — PROGRESS NOTES
Melecio Temple   (:1945) [phone] [facility],[address] @ [address]  [address] [place] [address].  [phone]   [phone]           PHYSICIAN COMMUNICATION and discharge    REFERRING PHYSICIAN: Elinor Piedra MD  Return Physician Appointment: to be determined  MEDICAL/REFERRING DIAGNOSIS:  · Pain in left hip [M25.552]  ATTENDANCE: Melecio Webber has attended 4 out of 5 visits, with 1 cancellation(s) and 0 no shows. ASSESSMENT:  DATE: 2017    PROGRESS: Melecio Webber progressed very well with therapy. At the time of his last visit he was continuing to have pain with prolonged sitting in [employer] but had no other symptoms with either clinical provocation or other daily activities. We did not elect to schedule any further visits and he was instructed to call if symptoms did not further improve and he has since not had to return to therapy. Treatment consisted of light stretching activities, neural mobilization exercises to improve movement of the sciatic nerve, and glute and hamstring strengthening. He was also instructed in a self massage and strengthening program which he was consistently performing. RECOMMENDATIONS: Patient will be discharged back to your care.      Thank you for this referral,  Sylvie Crum DPT

## 2018-09-28 ENCOUNTER — HOSPITAL ENCOUNTER (OUTPATIENT)
Dept: CT IMAGING | Age: 73
Discharge: HOME | End: 2018-09-28
Attending: INTERNAL MEDICINE
Payer: SELF-PAY

## 2018-09-28 DIAGNOSIS — Z91.89 CARDIOVASCULAR RISK FACTOR: ICD-10-CM

## 2018-09-28 PROCEDURE — 75571 CT HRT W/O DYE W/CA TEST: CPT

## 2018-10-01 NOTE — PROGRESS NOTES
Spoke to patient, message has already been seen on My Chart, please go ahead with cardiology referral as recommended.

## 2018-10-01 NOTE — PROGRESS NOTES
Let Mr. Hortencia Gonzalez know his coronary calcium score returned elevated at 431. Suggest he see a cardiologist.     How to interpret Coronary Calcium Score Results:  1-10: Minimal plaque burden with low risk cardiovascular disease. : Mild plaque burden with moderate risk of cardiovascular disease. 101-400: Moderate plaque burden with high risk of cardiovascular disease. Greater than 401: Extensive plaque burden with a very high cardiovascular  disease risk.

## 2018-10-07 PROBLEM — R93.1 ELEVATED CORONARY ARTERY CALCIUM SCORE: Status: ACTIVE | Noted: 2018-09-01

## 2018-10-24 ENCOUNTER — HOSPITAL ENCOUNTER (OUTPATIENT)
Dept: LAB | Age: 73
Discharge: HOME | End: 2018-10-24
Payer: MEDICARE

## 2018-10-24 DIAGNOSIS — I25.119 CORONARY ARTERY DISEASE INVOLVING NATIVE CORONARY ARTERY OF NATIVE HEART WITH ANGINA PECTORIS (HCC): ICD-10-CM

## 2018-10-24 PROBLEM — E78.00 PURE HYPERCHOLESTEROLEMIA: Status: ACTIVE | Noted: 2018-10-24

## 2018-10-24 LAB
ANION GAP SERPL CALC-SCNC: 7 MMOL/L
BASOPHILS # BLD: 0.1 K/UL (ref 0–0.2)
BASOPHILS NFR BLD: 1 % (ref 0–2)
BUN SERPL-MCNC: 18 MG/DL (ref 8–23)
CALCIUM SERPL-MCNC: 8.9 MG/DL (ref 8.3–10.4)
CHLORIDE SERPL-SCNC: 103 MMOL/L (ref 98–107)
CO2 SERPL-SCNC: 30 MMOL/L (ref 21–32)
CREAT SERPL-MCNC: 0.8 MG/DL (ref 0.8–1.5)
DIFFERENTIAL METHOD BLD: ABNORMAL
EOSINOPHIL # BLD: 0.1 K/UL (ref 0–0.8)
EOSINOPHIL NFR BLD: 2 % (ref 0.5–7.8)
ERYTHROCYTE [DISTWIDTH] IN BLOOD BY AUTOMATED COUNT: 12.6 %
GLUCOSE SERPL-MCNC: 101 MG/DL (ref 65–100)
HCT VFR BLD AUTO: 44.1 % (ref 41.1–50.3)
HGB BLD-MCNC: 14.7 G/DL (ref 13.6–17.2)
IMM GRANULOCYTES # BLD: 0 K/UL (ref 0–0.5)
IMM GRANULOCYTES NFR BLD AUTO: 0 % (ref 0–5)
INR PPP: 1.1
LYMPHOCYTES # BLD: 2.1 K/UL (ref 0.5–4.6)
LYMPHOCYTES NFR BLD: 32 % (ref 13–44)
MCH RBC QN AUTO: 31.7 PG (ref 26.1–32.9)
MCHC RBC AUTO-ENTMCNC: 33.3 G/DL (ref 31.4–35)
MCV RBC AUTO: 95.2 FL (ref 79.6–97.8)
MONOCYTES # BLD: 0.5 K/UL (ref 0.1–1.3)
MONOCYTES NFR BLD: 8 % (ref 4–12)
NEUTS SEG # BLD: 3.7 K/UL (ref 1.7–8.2)
NEUTS SEG NFR BLD: 57 % (ref 43–78)
NRBC # BLD: 0 K/UL (ref 0–0.2)
PLATELET # BLD AUTO: 146 K/UL (ref 150–450)
PMV BLD AUTO: 12.1 FL (ref 9.4–12.3)
POTASSIUM SERPL-SCNC: 4.7 MMOL/L (ref 3.5–5.1)
PROTHROMBIN TIME: 13.6 SEC (ref 11.5–14.5)
RBC # BLD AUTO: 4.63 M/UL (ref 4.23–5.6)
SODIUM SERPL-SCNC: 140 MMOL/L (ref 136–145)
WBC # BLD AUTO: 6.5 K/UL (ref 4.3–11.1)

## 2018-10-24 PROCEDURE — 85610 PROTHROMBIN TIME: CPT

## 2018-10-24 PROCEDURE — 36415 COLL VENOUS BLD VENIPUNCTURE: CPT

## 2018-10-24 PROCEDURE — 85025 COMPLETE CBC W/AUTO DIFF WBC: CPT

## 2018-10-24 PROCEDURE — 80048 BASIC METABOLIC PNL TOTAL CA: CPT

## 2018-10-25 ENCOUNTER — HOSPITAL ENCOUNTER (OUTPATIENT)
Dept: CARDIAC CATH/INVASIVE PROCEDURES | Age: 73
Discharge: HOME | End: 2018-10-25
Attending: INTERNAL MEDICINE | Admitting: INTERNAL MEDICINE
Payer: MEDICARE

## 2018-10-25 VITALS
OXYGEN SATURATION: 96 % | BODY MASS INDEX: 34.72 KG/M2 | RESPIRATION RATE: 18 BRPM | HEART RATE: 65 BPM | HEIGHT: 71 IN | SYSTOLIC BLOOD PRESSURE: 126 MMHG | WEIGHT: 248 LBS | DIASTOLIC BLOOD PRESSURE: 66 MMHG

## 2018-10-25 LAB
ATRIAL RATE: 77 BPM
CALCULATED P AXIS, ECG09: 55 DEGREES
CALCULATED R AXIS, ECG10: 56 DEGREES
CALCULATED T AXIS, ECG11: 63 DEGREES
DIAGNOSIS, 93000: NORMAL
P-R INTERVAL, ECG05: 158 MS
Q-T INTERVAL, ECG07: 366 MS
QRS DURATION, ECG06: 94 MS
QTC CALCULATION (BEZET), ECG08: 414 MS
VENTRICULAR RATE, ECG03: 77 BPM

## 2018-10-25 PROCEDURE — 74011250636 HC RX REV CODE- 250/636: Performed by: INTERNAL MEDICINE

## 2018-10-25 PROCEDURE — 74011250636 HC RX REV CODE- 250/636

## 2018-10-25 PROCEDURE — 74011636320 HC RX REV CODE- 636/320: Performed by: INTERNAL MEDICINE

## 2018-10-25 PROCEDURE — 93005 ELECTROCARDIOGRAM TRACING: CPT | Performed by: INTERNAL MEDICINE

## 2018-10-25 PROCEDURE — 77030019569 HC BND COMPR RAD TERU -B

## 2018-10-25 PROCEDURE — 93458 L HRT ARTERY/VENTRICLE ANGIO: CPT

## 2018-10-25 PROCEDURE — 74011000250 HC RX REV CODE- 250: Performed by: INTERNAL MEDICINE

## 2018-10-25 PROCEDURE — C1769 GUIDE WIRE: HCPCS

## 2018-10-25 PROCEDURE — 77030004559 HC CATH ANGI DX SUPT CARD -B

## 2018-10-25 PROCEDURE — 77030004558 HC CATH ANGI DX SUPR TORQ CARD -A

## 2018-10-25 PROCEDURE — 99152 MOD SED SAME PHYS/QHP 5/>YRS: CPT

## 2018-10-25 PROCEDURE — C1894 INTRO/SHEATH, NON-LASER: HCPCS

## 2018-10-25 RX ORDER — HYDROCODONE BITARTRATE AND ACETAMINOPHEN 5; 325 MG/1; MG/1
1 TABLET ORAL
Status: CANCELLED | OUTPATIENT
Start: 2018-10-25

## 2018-10-25 RX ORDER — MORPHINE SULFATE 2 MG/ML
1 INJECTION, SOLUTION INTRAMUSCULAR; INTRAVENOUS
Status: CANCELLED | OUTPATIENT
Start: 2018-10-25

## 2018-10-25 RX ORDER — SODIUM CHLORIDE 0.9 % (FLUSH) 0.9 %
5-10 SYRINGE (ML) INJECTION AS NEEDED
Status: CANCELLED | OUTPATIENT
Start: 2018-10-25

## 2018-10-25 RX ORDER — MIDAZOLAM HYDROCHLORIDE 1 MG/ML
.5-5 INJECTION, SOLUTION INTRAMUSCULAR; INTRAVENOUS
Status: DISCONTINUED | OUTPATIENT
Start: 2018-10-25 | End: 2018-10-25 | Stop reason: HOSPADM

## 2018-10-25 RX ORDER — SODIUM CHLORIDE 9 MG/ML
75 INJECTION, SOLUTION INTRAVENOUS CONTINUOUS
Status: CANCELLED | OUTPATIENT
Start: 2018-10-25

## 2018-10-25 RX ORDER — NALOXONE HYDROCHLORIDE 0.4 MG/ML
0.4 INJECTION, SOLUTION INTRAMUSCULAR; INTRAVENOUS; SUBCUTANEOUS AS NEEDED
Status: CANCELLED | OUTPATIENT
Start: 2018-10-25

## 2018-10-25 RX ORDER — ACETAMINOPHEN 325 MG/1
650 TABLET ORAL
Status: CANCELLED | OUTPATIENT
Start: 2018-10-25

## 2018-10-25 RX ORDER — LIDOCAINE HYDROCHLORIDE 10 MG/ML
1-20 INJECTION INFILTRATION; PERINEURAL ONCE
Status: COMPLETED | OUTPATIENT
Start: 2018-10-25 | End: 2018-10-25

## 2018-10-25 RX ORDER — SODIUM CHLORIDE 0.9 % (FLUSH) 0.9 %
5-10 SYRINGE (ML) INJECTION EVERY 8 HOURS
Status: CANCELLED | OUTPATIENT
Start: 2018-10-25

## 2018-10-25 RX ORDER — FENTANYL CITRATE 50 UG/ML
25-100 INJECTION, SOLUTION INTRAMUSCULAR; INTRAVENOUS
Status: DISCONTINUED | OUTPATIENT
Start: 2018-10-25 | End: 2018-10-25 | Stop reason: HOSPADM

## 2018-10-25 RX ORDER — ONDANSETRON 2 MG/ML
4 INJECTION INTRAMUSCULAR; INTRAVENOUS
Status: CANCELLED | OUTPATIENT
Start: 2018-10-25 | End: 2018-10-26

## 2018-10-25 RX ORDER — DIAZEPAM 5 MG/1
5 TABLET ORAL ONCE
Status: DISCONTINUED | OUTPATIENT
Start: 2018-10-25 | End: 2018-10-25 | Stop reason: HOSPADM

## 2018-10-25 RX ORDER — GUAIFENESIN 100 MG/5ML
81-324 LIQUID (ML) ORAL ONCE
Status: DISCONTINUED | OUTPATIENT
Start: 2018-10-25 | End: 2018-10-25 | Stop reason: HOSPADM

## 2018-10-25 RX ORDER — SODIUM CHLORIDE 9 MG/ML
75 INJECTION, SOLUTION INTRAVENOUS CONTINUOUS
Status: DISCONTINUED | OUTPATIENT
Start: 2018-10-25 | End: 2018-10-25 | Stop reason: HOSPADM

## 2018-10-25 RX ORDER — HEPARIN SODIUM 200 [USP'U]/100ML
3 INJECTION, SOLUTION INTRAVENOUS CONTINUOUS
Status: DISCONTINUED | OUTPATIENT
Start: 2018-10-25 | End: 2018-10-25 | Stop reason: HOSPADM

## 2018-10-25 RX ADMIN — FENTANYL CITRATE 25 MCG: 50 INJECTION, SOLUTION INTRAMUSCULAR; INTRAVENOUS at 15:59

## 2018-10-25 RX ADMIN — LIDOCAINE HYDROCHLORIDE 3 ML: 10 INJECTION, SOLUTION INFILTRATION; PERINEURAL at 16:00

## 2018-10-25 RX ADMIN — MIDAZOLAM HYDROCHLORIDE 2 MG: 1 INJECTION, SOLUTION INTRAMUSCULAR; INTRAVENOUS at 15:59

## 2018-10-25 RX ADMIN — SODIUM CHLORIDE 75 ML/HR: 900 INJECTION, SOLUTION INTRAVENOUS at 14:20

## 2018-10-25 RX ADMIN — HEPARIN SODIUM 2 ML: 10000 INJECTION INTRAVENOUS; SUBCUTANEOUS at 16:01

## 2018-10-25 RX ADMIN — HEPARIN SODIUM 3 ML/HR: 5000 INJECTION, SOLUTION INTRAVENOUS; SUBCUTANEOUS at 15:51

## 2018-10-25 RX ADMIN — IOPAMIDOL 80 ML: 755 INJECTION, SOLUTION INTRAVENOUS at 16:12

## 2018-10-25 NOTE — PROGRESS NOTES
Report received from [address] [place] [address]. Procedural findings communicated. Intra procedural  medication administration reviewed. Progression of care discussed. Patient received into [address] [facility] 3 post sheath removal.  
 
Right Radial access site without bleeding or swelling TR band dry and intact Patient instructed to limit movement to right upper extremity Routine post procedural vital signs and site assessment initiated

## 2018-10-25 NOTE — PROGRESS NOTES
TRANSFER - OUT REPORT: 
 
Verbal report given to mejia rn(name) on Marianne Verduzco.  being transferred to cpru(unit) for routine progression of care Report consisted of patients Situation, Background, Assessment and  
Recommendations(SBAR). Information from the following report(s) SBAR was reviewed with the receiving nurse. Lines:  
Peripheral IV 10/25/18 Anterior; Inferior;Left;Lower;Proximal Arm (Active) Opportunity for questions and clarification was provided. Patient transported with: 
 [facility] Dr Dossie Cabot No intervention Right wrist tr band 13ml No bleeding or hematoma Versed 2mg Fentanyl 25mcg

## 2018-10-25 NOTE — PROGRESS NOTES
Assisted OOB & ambulated to BR & on unit. Tolerated activity without difficulty. Right wrist without bleeding or hematoma

## 2018-10-25 NOTE — PROGRESS NOTES
Discharge instructions and home medications reviewed with patient. Time allowed for questions and answers. Discharged to home via wheel chair

## 2018-10-25 NOTE — PROCEDURES
Brief Cardiac Procedure Note    Patient: Gui Carlton MRN: [id number]  SSN: [id number]    YOB: 1945  Age: 71 y.o. Sex: male      Date of Procedure: 10/25/2018     Pre-procedure Diagnosis: Coronary Artery Disease    Post-procedure Diagnosis: Coronary Artery Disease    Reason for Procedure: New Onset Angina < or = 2 Months    Procedure: Left Heart Catheterization    Brief Description of Procedure: LHC via R radial artery    Performed By: Ephraim Phelps MD     Assistants: None    Anesthesia: Moderate Sedation    Estimated Blood Loss: Less than 10 mL      Specimens: None    Implants: None    Findings: LM normal, LAD 30% mid, Circ luminal irregs no greater than 10%, RCA luminal irregs not greater than 10%. LVEF 55-60%, LVEDP 20 mmHg. Complications: None    Recommendations: Continue medical therapy.     Signed By: Ephraim Phelps MD     October 25, 2018

## 2018-10-25 NOTE — PROGRESS NOTES
Patient received to [address] [place] [address] room # 12  Ambulatory from [facility]. Patient scheduled for [facility] today with Dr Baron Hawkins. Procedure reviewed & questions answered, voiced good understanding consent obtained & placed on chart. All medications and medical history reviewed. Will prep patient per orders. Patient & family updated on plan of care. The patient has a fraility score of 3-MANAGING WELL, based on patient A&Ox3, patient able to perform ADL' s independently, patient able to ambulate to room without difficulty.

## 2018-10-25 NOTE — DISCHARGE INSTRUCTIONS

## 2018-10-26 NOTE — PROCEDURES
[address] CATH    Name:JEAN Lawler  MR#: [id number]  : 1945  ACCOUNT #: [de-identified]   DATE OF SERVICE: 10/25/2018    REFERRING PHYSICIAN:  Sherlyn Hull     INDICATIONS:  The patient is a very pleasant 71-year-old male who has recently undergone a chest CT with a calcium score in the 400s. He describes exertional chest pain and dyspnea concerning for obstructive coronary artery disease. He was brought to the heart catheterization lab to evaluate his cardiac anatomy. BLOOD LOSS:  Less than 5 mL. SEDATION:  The patient was given 2 mg of Versed and 25 mcg of fentanyl and monitored for conscious sedation beginning at 1557 and ending at 1614 by nurse Ghislaine Chen. SPECIMENS REMOVED:  None. COMPLICATIONS:  None. ASSISTANTS:  None. Preprocedure timeout was completed. Mallampati score of 2, ASA score of 2. DESCRIPTION OF PROCEDURE:  After informed consent, the patient was prepped and draped in the usual sterile fashion. The right wrist was infiltrated with lidocaine. The right radial artery was accessed via the modified Seldinger technique with a 6-Ivorian sheath. A total of 80 mL of Isovue contrast were used for the entire procedure. A Terumo band was used for hemostasis. CATHETERS USED:  Include a 6-Ivorian JL3.5, 6-Ivorian JR5, 6-Ivorian angle pigtail catheter. FINDINGS:  1. Left ventricle:  Left ventricular ejection fraction is estimated at 55-60% with normal wall motion. 2.  LVEDP:  20 mmHg. 3.  Left main:  Was normal.  4.  Left anterior descending artery had luminal irregularities throughout with a focal stenosis of approximately 30% in the mid to distal vessel. 5.  The circumflex artery had luminal irregularities throughout, no greater than 10%. 6.  Right coronary artery was a dominant artery and had luminal irregularities throughout, no greater than 10%.     CONCLUSIONS:  This is a 71-year-old male who presents with elevated calcium score and signs concerning for angina and was found to have nonobstructive coronary artery disease by angiography. We will have him follow up with Dr. Yoshi Navarrete in 3 weeks in clinic for ongoing management of his coronary artery disease.       MD ALEJANDRA De Oliveira / [place]  D: 10/25/2018 16:28     T: 10/25/2018 20:54  JOB #: [id number]

## 2018-11-19 PROBLEM — I25.10 CORONARY ARTERY DISEASE INVOLVING NATIVE CORONARY ARTERY OF NATIVE HEART WITHOUT ANGINA PECTORIS: Status: ACTIVE | Noted: 2018-10-24

## 2018-11-19 PROBLEM — R93.1 ELEVATED CORONARY ARTERY CALCIUM SCORE: Status: RESOLVED | Noted: 2018-09-01 | Resolved: 2018-11-19

## 2019-09-03 ENCOUNTER — HOSPITAL ENCOUNTER (OUTPATIENT)
Dept: LAB | Age: 74
Discharge: HOME | End: 2019-09-03

## 2019-09-03 PROCEDURE — 88305 TISSUE EXAM BY PATHOLOGIST: CPT

## 2019-11-19 PROBLEM — E78.00 PURE HYPERCHOLESTEROLEMIA: Status: RESOLVED | Noted: 2018-10-24 | Resolved: 2019-11-19

## 2022-03-18 PROBLEM — I25.10 CORONARY ARTERY DISEASE INVOLVING NATIVE CORONARY ARTERY OF NATIVE HEART WITHOUT ANGINA PECTORIS: Status: ACTIVE | Noted: 2018-10-24

## 2023-01-09 ENCOUNTER — OFFICE VISIT (OUTPATIENT)
Dept: UROLOGY | Age: 78
End: 2023-01-09
Payer: COMMERCIAL

## 2023-01-09 DIAGNOSIS — N40.1 BENIGN PROSTATIC HYPERPLASIA WITH LOWER URINARY TRACT SYMPTOMS, SYMPTOM DETAILS UNSPECIFIED: Primary | ICD-10-CM

## 2023-01-09 DIAGNOSIS — R97.20 ELEVATED PROSTATE SPECIFIC ANTIGEN (PSA): ICD-10-CM

## 2023-01-09 LAB
BILIRUBIN, URINE, POC: NEGATIVE
BLOOD URINE, POC: ABNORMAL
GLUCOSE URINE, POC: NEGATIVE
KETONES, URINE, POC: NEGATIVE
LEUKOCYTE ESTERASE, URINE, POC: NEGATIVE
NITRITE, URINE, POC: NEGATIVE
PH, URINE, POC: 6 (ref 4.6–8)
PROTEIN,URINE, POC: NEGATIVE
SPECIFIC GRAVITY, URINE, POC: 1.02 (ref 1–1.03)
URINALYSIS CLARITY, POC: ABNORMAL
URINALYSIS COLOR, POC: ABNORMAL
UROBILINOGEN, POC: ABNORMAL

## 2023-01-09 PROCEDURE — 99214 OFFICE O/P EST MOD 30 MIN: CPT | Performed by: UROLOGY

## 2023-01-09 PROCEDURE — 1123F ACP DISCUSS/DSCN MKR DOCD: CPT | Performed by: UROLOGY

## 2023-01-09 PROCEDURE — 81003 URINALYSIS AUTO W/O SCOPE: CPT | Performed by: UROLOGY

## 2023-01-09 RX ORDER — FINASTERIDE 5 MG/1
5 TABLET, FILM COATED ORAL DAILY
Qty: 90 TABLET | Refills: 3 | Status: SHIPPED | OUTPATIENT
Start: 2023-01-09

## 2023-01-09 NOTE — PROGRESS NOTES
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 Pt.'s psa elevated to 4.9 on 3/22/11. It was 3.55 3/2/10. He denies any family history of prostate cancer. He denies any gross hematuria. He did also suffer from moderate LUTS and nocturia is X 3. Pt. underwent prostate biopsy 11. It revealed 2 cores of HGPIN, o/w benign. Gland was 82 grams! He began avodart . Voiding greatly improved. Libido decreased on avodart, so he switched to qod dosing early . He switched to daily finasteride in 11/15.          PSA: 3/10 3.55, 3/11 4.9, 11 5.76, 12 2.21,  2.22,  2.36, 4/15 1.5,  1.405, 10/17 1.31,  1.3,  1.2,  5.1 (UTI),  1.5,  2.2,  1.49             Past Medical History:   Diagnosis Date    CAD (coronary artery disease)     3 vessel nonobstructive, [facility]     Carotid stenosis, asymptomatic, right     less than 50%, Carotid U/S 2018 Amber    Chronic insomnia     Diverticulosis     Dyslipidemia     Elevated coronary artery calcium score 2018        Elevated prostate specific antigen (PSA)     GERD (gastroesophageal reflux disease)     History of skin cancer     Hypertension     Hypertrophy of prostate without urinary obstruction and other lower urinary tract symptoms (LUTS)     Obesity, Class II, BMI 35-39.9     Prediabetes      Past Surgical History:   Procedure Laterality Date    BUNIONECTOMY Left     CARDIAC CATHETERIZATION  10/2018    CHOLECYSTECTOMY, LAPAROSCOPIC      COLONOSCOPY  2013    diverticulosis    CYST REMOVAL Left     Knee    MOHS SURGERY Left     OTHER SURGICAL HISTORY  2013    Nissen Fundoplication    PROSTATE BIOPSY, NEEDLE, SATURATION SAMPLING REFRACTIVE SURGERY      SHOULDER ARTHROPLASTY Left 2010    SHOULDER ARTHROSCOPY Left      Current Outpatient Medications   Medication Sig Dispense Refill    finasteride (PROSCAR) 5 MG tablet Take 1 tablet by mouth daily 90 tablet 3    ascorbic acid (VITAMIN C) 1000 MG tablet Take 1,000 mg by mouth      vitamin D3 (CHOLECALCIFEROL) 125 MCG (5000 UT) TABS tablet Take by mouth daily      finasteride (PROSCAR) 5 MG tablet TAKE 1 TABLET DAILY      lisinopril-hydroCHLOROthiazide (PRINZIDE;ZESTORETIC) 20-25 MG per tablet TAKE 1 TABLET EVERY MORNINGFOR HIGH BLOOD PRESSURE      Magnesium 500 MG CAPS Take by mouth      rosuvastatin (CRESTOR) 5 MG tablet TAKE 1 TABLET NIGHTLY      zolpidem (AMBIEN) 10 MG tablet TAKE 1 TABLET NIGHTLY AS NEEDED FOR SLEEP, MAXIMUM DAILY AMOUNT: 10MG. No current facility-administered medications for this visit. Allergies   Allergen Reactions    Tamsulosin Other (See Comments)     Headaches, lightheadedness     Social History     Socioeconomic History    Marital status:      Spouse name: Not on file    Number of children: Not on file    Years of education: Not on file    Highest education level: Not on file   Occupational History    Not on file   Tobacco Use    Smoking status: Former     Packs/day: 3.00     Types: Cigarettes     Quit date: 1988     Years since quittin.0    Smokeless tobacco: Never   Substance and Sexual Activity    Alcohol use:  Yes     Alcohol/week: 1.7 standard drinks    Drug use: No    Sexual activity: Not on file   Other Topics Concern    Not on file   Social History Narrative    Not on file     Social Determinants of Health     Financial Resource Strain: Not on file   Food Insecurity: Not on file   Transportation Needs: Not on file   Physical Activity: Not on file   Stress: Not on file   Social Connections: Not on file   Intimate Partner Violence: Not on file   Housing Stability: Not on file     Family History   Problem Relation Age of Onset    Priscila Louise Bladder Disease Sister     Migraines Sister     Hypertension Maternal Grandmother     Cancer Paternal Grandmother     Alcohol Abuse Father     Stroke Paternal Grandmother     Heart Disease Mother     Psychiatric Disorder Father     Osteoarthritis Father     Hypertension Paternal Grandmother        Review of Systems  Constitutional: Negative  GI: Neg GI ROS  Genitourinary: Genitourinary negative    Urinalysis  UA - Dipstick  Results for orders placed or performed in visit on 01/09/23   AMB POC URINALYSIS DIP STICK AUTO W/O MICRO   Result Value Ref Range    Color (UA POC)      Clarity (UA POC)      Glucose, Urine, POC Negative Negative    Bilirubin, Urine, POC Negative Negative    KETONES, Urine, POC Negative Negative    Specific Gravity, Urine, POC 1.025 1.001 - 1.035    Blood (UA POC) Trace-intact Negative    pH, Urine, POC 6.0 4.6 - 8.0    Protein, Urine, POC Negative Negative    Urobilinogen, POC 2 mg/dL     Nitrite, Urine, POC Negative Negative    Leukocyte Esterase, Urine, POC Negative Negative       There were no vitals taken for this visit. GENERAL: NAD, ALERT, A&O x 3, GAIT NORMAL  LUNGS: easy work of breathing  ABDOMEN: soft, non tender  RENEE: 2+ no nodule  NEUROLOGIC: cranial nerves 2-12 grossly intact           Assessment and Plan    ICD-10-CM    1. Benign prostatic hyperplasia with lower urinary tract symptoms, symptom details unspecified  N40.1 AMB POC URINALYSIS DIP STICK AUTO W/O MICRO     finasteride (PROSCAR) 5 MG tablet      2. Elevated prostate specific antigen (PSA)  R97.20 AMB POC URINALYSIS DIP STICK AUTO W/O MICRO     PSA, Diagnostic        In regards to bph, he will continue finasteride. In regards to elevated psa, psa was low and stable this year. Pt. will follow up in 12 months with psa for RENEE.

## 2023-07-03 ENCOUNTER — OFFICE VISIT (OUTPATIENT)
Dept: ORTHOPEDIC SURGERY | Age: 78
End: 2023-07-03
Payer: COMMERCIAL

## 2023-07-03 DIAGNOSIS — M17.12 PRIMARY OSTEOARTHRITIS OF LEFT KNEE: Primary | ICD-10-CM

## 2023-07-03 PROCEDURE — 1123F ACP DISCUSS/DSCN MKR DOCD: CPT | Performed by: PHYSICIAN ASSISTANT

## 2023-07-03 PROCEDURE — 20611 DRAIN/INJ JOINT/BURSA W/US: CPT | Performed by: PHYSICIAN ASSISTANT

## 2023-07-03 PROCEDURE — 99214 OFFICE O/P EST MOD 30 MIN: CPT | Performed by: PHYSICIAN ASSISTANT

## 2023-07-03 RX ORDER — TRIAMCINOLONE ACETONIDE 40 MG/ML
40 INJECTION, SUSPENSION INTRA-ARTICULAR; INTRAMUSCULAR ONCE
Status: COMPLETED | OUTPATIENT
Start: 2023-07-03 | End: 2023-07-03

## 2023-07-03 RX ADMIN — TRIAMCINOLONE ACETONIDE 40 MG: 40 INJECTION, SUSPENSION INTRA-ARTICULAR; INTRAMUSCULAR at 11:05

## 2023-07-03 NOTE — PROGRESS NOTES
Name: Alejandro Garcia. YOB: 1945  Gender: male  MRN: [id number]    CC:   Chief Complaint   Patient presents with    Knee Pain     Left knee pain          DIAGNOSIS:   Encounter Diagnosis   Name Primary? Primary osteoarthritis of left knee Yes        HPI:   The left knee pain has been present for months and is becoming worse. It hurts at night when sleeping. The pain is located over the knee. It does hurt to walk and gets worse with increased distances. The pain does not radiate down the leg. Numbness and tingling are not noted. Treatment so far has been IA cortisone and HP injections. Current Outpatient Medications:     finasteride (PROSCAR) 5 MG tablet, Take 1 tablet by mouth daily, Disp: 90 tablet, Rfl: 3    ascorbic acid (VITAMIN C) 1000 MG tablet, Take 1,000 mg by mouth, Disp: , Rfl:     vitamin D3 (CHOLECALCIFEROL) 125 MCG (5000 UT) TABS tablet, Take by mouth daily, Disp: , Rfl:     finasteride (PROSCAR) 5 MG tablet, TAKE 1 TABLET DAILY, Disp: , Rfl:     lisinopril-hydroCHLOROthiazide (PRINZIDE;ZESTORETIC) 20-25 MG per tablet, TAKE 1 TABLET EVERY MORNINGFOR HIGH BLOOD PRESSURE, Disp: , Rfl:     Magnesium 500 MG CAPS, Take by mouth, Disp: , Rfl:     rosuvastatin (CRESTOR) 5 MG tablet, TAKE 1 TABLET NIGHTLY, Disp: , Rfl:     zolpidem (AMBIEN) 10 MG tablet, TAKE 1 TABLET NIGHTLY AS NEEDED FOR SLEEP, MAXIMUM DAILY AMOUNT: 10MG. , Disp: , Rfl:   Allergies   Allergen Reactions    Tamsulosin Other (See Comments)     Headaches, lightheadedness     Past Medical History:   Diagnosis Date    CAD (coronary artery disease)     3 vessel nonobstructive, [facility] 2018    Carotid stenosis, asymptomatic, right     less than 50%, Carotid U/S 6/2018 Amber    Chronic insomnia     Diverticulosis     Dyslipidemia     Elevated coronary artery calcium score 09/2018        Elevated prostate specific antigen (PSA)     GERD (gastroesophageal reflux disease)     History of skin cancer     Hypertension

## 2023-08-07 ENCOUNTER — OFFICE VISIT (OUTPATIENT)
Dept: ORTHOPEDIC SURGERY | Age: 78
End: 2023-08-07
Payer: COMMERCIAL

## 2023-08-07 DIAGNOSIS — M17.12 PRIMARY OSTEOARTHRITIS OF LEFT KNEE: Primary | ICD-10-CM

## 2023-08-07 PROCEDURE — 20611 DRAIN/INJ JOINT/BURSA W/US: CPT | Performed by: ORTHOPAEDIC SURGERY

## 2023-08-07 NOTE — PROGRESS NOTES
Name: Sujata Phelps. YOB: 1945  Gender: male  MRN: [id number]      CC: Left Knee Pain     PROCEDURE: 1 of 3     DIAGNOSIS:   Encounter Diagnosis   Name Primary? Primary osteoarthritis of left knee Yes        [employer] US unit with variable frequency (6.0-15.0 MHz) linear transducer was used to visualize the retropatellar fat pad, patella tendon, patella, tibia, and to ensure proper intra-articular needle placement. Injection image was saved to patient's permanent chart. Procedure Note: The patient was placed in upright position with knee hanging freely from exam table. The left knee was prepped in sterile fashion using alcohol wipe. Using [employer] ultrasound guidance, a 22 gauge needle was then introduced into the knee joint from an infrapatellar approach and 2 mL of Synvisc was injected freely. The needle was then removed, pressure hemostatis achieved, injection site was cleansed with alcohol wipe and dressed with band aid. The patient tolerated the procedure without complication. The patient  will follow up as scheduled.

## 2023-08-14 ENCOUNTER — OFFICE VISIT (OUTPATIENT)
Dept: ORTHOPEDIC SURGERY | Age: 78
End: 2023-08-14
Payer: COMMERCIAL

## 2023-08-14 DIAGNOSIS — M17.12 PRIMARY OSTEOARTHRITIS OF LEFT KNEE: Primary | ICD-10-CM

## 2023-08-14 PROCEDURE — 20611 DRAIN/INJ JOINT/BURSA W/US: CPT | Performed by: ORTHOPAEDIC SURGERY

## 2023-08-14 NOTE — PROGRESS NOTES
Name: Arlene Cabrera. YOB: 1945  Gender: male  MRN: [id number]      CC: Left Knee Pain     PROCEDURE: 2 of 3 HP    DIAGNOSIS:   Encounter Diagnosis   Name Primary? Primary osteoarthritis of left knee Yes        [employer] US unit with variable frequency (6.0-15.0 MHz) linear transducer was used to visualize the retropatellar fat pad, patella tendon, patella, tibia, and to ensure proper intra-articular needle placement. Injection image was saved to patient's permanent chart. Procedure Note: The patient was placed in upright position with knee hanging freely from exam table. The left knee was prepped in sterile fashion using alcohol wipe. Using [employer] ultrasound guidance, a 22 gauge needle was then introduced into the knee joint from an infrapatellar approach and 2 mL of Synvisc was injected freely. The needle was then removed, pressure hemostatis achieved, injection site was cleansed with alcohol wipe and dressed with band aid. The patient tolerated the procedure without complication. The patient  will follow up as scheduled.

## 2023-08-21 ENCOUNTER — OFFICE VISIT (OUTPATIENT)
Dept: ORTHOPEDIC SURGERY | Age: 78
End: 2023-08-21
Payer: COMMERCIAL

## 2023-08-21 DIAGNOSIS — M17.12 PRIMARY OSTEOARTHRITIS OF LEFT KNEE: Primary | ICD-10-CM

## 2023-08-21 PROCEDURE — 20611 DRAIN/INJ JOINT/BURSA W/US: CPT | Performed by: ORTHOPAEDIC SURGERY

## 2023-08-21 NOTE — PROGRESS NOTES
Name: Paul Nayak YOB: 1945  Gender: male  MRN: [id number]      CC: Left Knee Pain     PROCEDURE: 3 of 3 HP    DIAGNOSIS:   Encounter Diagnosis   Name Primary? Primary osteoarthritis of left knee Yes        [employer] US unit with variable frequency (6.0-15.0 MHz) linear transducer was used to visualize the retropatellar fat pad, patella tendon, patella, tibia, and to ensure proper intra-articular needle placement. Injection image was saved to patient's permanent chart. Procedure Note: The patient was placed in upright position with knee hanging freely from exam table. The left knee was prepped in sterile fashion using alcohol wipe. Using [employer] ultrasound guidance, a 22 gauge needle was then introduced into the knee joint from an infrapatellar approach and 2 mL of Synvisc was injected freely. The needle was then removed, pressure hemostatis achieved, injection site was cleansed with alcohol wipe and dressed with band aid. The patient tolerated the procedure without complication. The patient  will follow up as desired. He seems to do well with intermittent HP injections. He usually tolerates greater than 6 months duration.   He will call us if he wants to pursue such

## 2024-01-02 DIAGNOSIS — R97.20 ELEVATED PROSTATE SPECIFIC ANTIGEN (PSA): ICD-10-CM

## 2024-01-02 LAB — PSA SERPL-MCNC: 2 NG/ML

## 2024-01-26 ENCOUNTER — OFFICE VISIT (OUTPATIENT)
Age: 79
End: 2024-01-26

## 2024-01-26 VITALS
BODY MASS INDEX: 32.48 KG/M2 | DIASTOLIC BLOOD PRESSURE: 66 MMHG | SYSTOLIC BLOOD PRESSURE: 92 MMHG | HEART RATE: 77 BPM | HEIGHT: 71 IN | WEIGHT: 232 LBS

## 2024-01-26 DIAGNOSIS — I25.10 CORONARY ARTERY DISEASE INVOLVING NATIVE CORONARY ARTERY OF NATIVE HEART WITHOUT ANGINA PECTORIS: Primary | ICD-10-CM

## 2024-01-26 RX ORDER — VIT C/B6/B5/MAGNESIUM/HERB 173 50-5-6-5MG
CAPSULE ORAL DAILY
COMMUNITY

## 2024-01-26 RX ORDER — OMEGA-3 FATTY ACIDS/FISH OIL 300-1000MG
CAPSULE ORAL
COMMUNITY

## 2024-01-26 NOTE — PROGRESS NOTES
[address] [place] [address], [place] [address]  PHONE: [phone]    Ronnie Ludwig Jr.  1945    SUBJECTIVE:   Ronnie Ludwig Jr. is a 78 y.o. male seen for initial evaluation of:      Chief Complaint   Patient presents with    Coronary Artery Disease        Cardiac Hx (Reviewed and summarized by me):  Last seen 11/19/18 Dr Green  1) CAD   Cardiac Calcium Score - 9/28/18 - 431  Cath 10/25/18 - Mild nonobstructive CAD (Green)  2) Lipids   11/17/23 - HDL 49, LDL 83, Trig 110, Ratio 3.1 (had been off statin for 6 months at this point)   Took Rosuvastatin - had muscle weakness and neuropathy  3) HTN    HPI:  78-year-old male with the above listed cardiac history.  Had a heart cath about 5 years ago that showed very mild nonobstructive disease.  His last lipid panel was done in November and he was not taking a statin at that time.  He has tried statins in the past he is intolerant to the medications due to muscle weakness neuropathy and pain.    ECG: NSR normal axis no ischemia (Independent review/interpretation by me)      Past Medical History, Past Surgical History, Family history, Social History, and Medications were all reviewed with the patient today and updated as necessary.       Current Outpatient Medications:     Omega 3 1000 MG CAPS, Take by mouth, Disp: , Rfl:     Misc Natural Products (GLUCOSAMINE CHOND COMPLEX/MSM PO), Take by mouth, Disp: , Rfl:     Multiple Vitamins-Minerals (MULTIVITAMIN ADULT, MINERALS, PO), Take by mouth, Disp: , Rfl:     Misc Natural Products (PROSTATE HEALTH PO), Take by mouth, Disp: , Rfl:     Coenzyme Q10 (COQ-10) 100 MG CAPS, Take by mouth, Disp: , Rfl:     Probiotic Product (PROBIOTIC BLEND PO), Take by mouth, Disp: , Rfl:     turmeric (CURCUMIN 95) 500 MG CAPS, Take by mouth daily, Disp: , Rfl:     finasteride (PROSCAR) 5 MG tablet, Take 1 tablet by mouth daily, Disp: 90 tablet, Rfl: 3    ascorbic acid (VITAMIN C) 1000 MG tablet, Take 1 tablet by mouth,

## 2024-02-27 ENCOUNTER — OFFICE VISIT (OUTPATIENT)
Dept: UROLOGY | Age: 79
End: 2024-02-27
Payer: MEDICARE

## 2024-02-27 DIAGNOSIS — R97.20 ELEVATED PROSTATE SPECIFIC ANTIGEN (PSA): ICD-10-CM

## 2024-02-27 DIAGNOSIS — N40.1 BENIGN PROSTATIC HYPERPLASIA WITH LOWER URINARY TRACT SYMPTOMS, SYMPTOM DETAILS UNSPECIFIED: Primary | ICD-10-CM

## 2024-02-27 LAB
BILIRUBIN, URINE, POC: NEGATIVE
BLOOD URINE, POC: NORMAL
GLUCOSE URINE, POC: NEGATIVE
KETONES, URINE, POC: NEGATIVE
LEUKOCYTE ESTERASE, URINE, POC: NEGATIVE
NITRITE, URINE, POC: NEGATIVE
PH, URINE, POC: 5.5 (ref 4.6–8)
PROTEIN,URINE, POC: 30
SPECIFIC GRAVITY, URINE, POC: 1.03 (ref 1–1.03)
URINALYSIS CLARITY, POC: NORMAL
URINALYSIS COLOR, POC: NORMAL
UROBILINOGEN, POC: NORMAL

## 2024-02-27 PROCEDURE — G8427 DOCREV CUR MEDS BY ELIG CLIN: HCPCS | Performed by: UROLOGY

## 2024-02-27 PROCEDURE — 99213 OFFICE O/P EST LOW 20 MIN: CPT | Performed by: UROLOGY

## 2024-02-27 PROCEDURE — G8484 FLU IMMUNIZE NO ADMIN: HCPCS | Performed by: UROLOGY

## 2024-02-27 PROCEDURE — 1123F ACP DISCUSS/DSCN MKR DOCD: CPT | Performed by: UROLOGY

## 2024-02-27 PROCEDURE — 1036F TOBACCO NON-USER: CPT | Performed by: UROLOGY

## 2024-02-27 PROCEDURE — G8417 CALC BMI ABV UP PARAM F/U: HCPCS | Performed by: UROLOGY

## 2024-02-27 PROCEDURE — 81003 URINALYSIS AUTO W/O SCOPE: CPT | Performed by: UROLOGY

## 2024-02-27 RX ORDER — FINASTERIDE 5 MG/1
5 TABLET, FILM COATED ORAL DAILY
Qty: 90 TABLET | Refills: 3 | Status: SHIPPED | OUTPATIENT
Start: 2024-02-27

## 2024-02-27 ASSESSMENT — ENCOUNTER SYMPTOMS: BACK PAIN: 0

## 2024-02-27 NOTE — PROGRESS NOTES
Alcohol/week: 2.0 standard drinks of alcohol     Types: 2 Glasses of wine per week    Drug use: No    Sexual activity: Yes     Partners: Female   Other Topics Concern    Not on file   Social History Narrative    Not on file     Social Determinants of Health     Financial Resource Strain: Not on file   Food Insecurity: Not on file   Transportation Needs: Not on file   Physical Activity: Not on file   Stress: Not on file   Social Connections: Not on file   Intimate Partner Violence: Not on file   Housing Stability: Not on file     Family History   Problem Relation Age of Onset    Gall Bladder Disease Sister     Migraines Sister     Hypertension Maternal Grandmother     Cancer Paternal Grandmother         pancreatic    Stroke Paternal Grandmother     Hypertension Paternal Grandmother     Alcohol Abuse Father     Psychiatric Disorder Father     Osteoarthritis Father     Heart Disease Mother         Pig valve       Review of Systems  Constitutional:   Negative for fever.  Genitourinary:  Negative for hematuria.  Musculoskeletal:  Negative for back pain.      Urinalysis  UA - Dipstick  Results for orders placed or performed in visit on 02/27/24   AMB POC URINALYSIS DIP STICK AUTO W/O MICRO   Result Value Ref Range    Color (UA POC)      Clarity (UA POC)      Glucose, Urine, POC Negative     Bilirubin, Urine, POC Negative     KETONES, Urine, POC Negative     Specific Gravity, Urine, POC 1.030 1.001 - 1.035    Blood (UA POC) Trace     pH, Urine, POC 5.5 4.6 - 8.0    Protein, Urine, POC 30     Urobilinogen, POC 1 mg/dL     Nitrite, Urine, POC Negative     Leukocyte Esterase, Urine, POC Negative        There were no vitals taken for this visit.     GENERAL: NAD, ALERT, A&O x 3, GAIT NORMAL  LUNGS: easy work of breathing  ABDOMEN: soft, non tender  RENEE: 1+ no nodule  NEUROLOGIC: cranial nerves 2-12 grossly intact           Assessment and Plan    ICD-10-CM    1. Benign prostatic hyperplasia with lower urinary tract symptoms,

## 2025-02-05 DIAGNOSIS — N40.1 BENIGN PROSTATIC HYPERPLASIA WITH LOWER URINARY TRACT SYMPTOMS, SYMPTOM DETAILS UNSPECIFIED: ICD-10-CM

## 2025-02-05 RX ORDER — FINASTERIDE 5 MG/1
5 TABLET, FILM COATED ORAL DAILY
Qty: 90 TABLET | Refills: 3 | Status: SHIPPED | OUTPATIENT
Start: 2025-02-05

## 2025-02-19 ENCOUNTER — LAB (OUTPATIENT)
Dept: UROLOGY | Age: 80
End: 2025-02-19

## 2025-02-19 DIAGNOSIS — R97.20 ELEVATED PROSTATE SPECIFIC ANTIGEN (PSA): ICD-10-CM

## 2025-02-19 LAB — PSA SERPL-MCNC: 1.4 NG/ML (ref 0–4)

## 2025-02-24 ENCOUNTER — OFFICE VISIT (OUTPATIENT)
Dept: UROLOGY | Age: 80
End: 2025-02-24
Payer: MEDICARE

## 2025-02-24 DIAGNOSIS — R97.20 ELEVATED PROSTATE SPECIFIC ANTIGEN (PSA): ICD-10-CM

## 2025-02-24 DIAGNOSIS — N40.1 BENIGN PROSTATIC HYPERPLASIA WITH LOWER URINARY TRACT SYMPTOMS, SYMPTOM DETAILS UNSPECIFIED: Primary | ICD-10-CM

## 2025-02-24 LAB
BILIRUBIN, URINE, POC: NEGATIVE
BLOOD URINE, POC: NEGATIVE
GLUCOSE URINE, POC: NEGATIVE MG/DL
KETONES, URINE, POC: NEGATIVE MG/DL
LEUKOCYTE ESTERASE, URINE, POC: NEGATIVE
NITRITE, URINE, POC: NEGATIVE
PH, URINE, POC: 6 (ref 4.6–8)
PROTEIN,URINE, POC: NEGATIVE MG/DL
SPECIFIC GRAVITY, URINE, POC: 1.01 (ref 1–1.03)
URINALYSIS CLARITY, POC: NORMAL
URINALYSIS COLOR, POC: NORMAL
UROBILINOGEN, POC: NORMAL MG/DL

## 2025-02-24 PROCEDURE — 1159F MED LIST DOCD IN RCRD: CPT | Performed by: UROLOGY

## 2025-02-24 PROCEDURE — G8427 DOCREV CUR MEDS BY ELIG CLIN: HCPCS | Performed by: UROLOGY

## 2025-02-24 PROCEDURE — 81003 URINALYSIS AUTO W/O SCOPE: CPT | Performed by: UROLOGY

## 2025-02-24 PROCEDURE — 1160F RVW MEDS BY RX/DR IN RCRD: CPT | Performed by: UROLOGY

## 2025-02-24 PROCEDURE — 1123F ACP DISCUSS/DSCN MKR DOCD: CPT | Performed by: UROLOGY

## 2025-02-24 PROCEDURE — 99213 OFFICE O/P EST LOW 20 MIN: CPT | Performed by: UROLOGY

## 2025-02-24 PROCEDURE — G8421 BMI NOT CALCULATED: HCPCS | Performed by: UROLOGY

## 2025-02-24 PROCEDURE — 1036F TOBACCO NON-USER: CPT | Performed by: UROLOGY

## 2025-02-24 ASSESSMENT — ENCOUNTER SYMPTOMS
NAUSEA: 0
BACK PAIN: 0

## 2025-02-24 NOTE — PROGRESS NOTES
[facility]  [address] [place] [id number]  [phone]          Ronnie Ludwig Jr.  : 1945    Chief Complaint   Patient presents with    Follow-up          HPI     Ronnie Ludwig Jr. is a 79 y.o. male who presented with an elevated PSA. Pt.'s psa elevated to 4.9 on 3/22/11.  It was 3.55 3/2/10.  He denies any family history of prostate cancer.  He denies any gross hematuria.  He did also suffer from moderate LUTS and nocturia is X 3.        Pt. underwent prostate biopsy 11.  It revealed 2 cores of HGPIN, o/w benign.  Gland was 82 grams!  He began avodart .  Voiding greatly improved.  Libido decreased on avodart, so he switched to qod dosing early .  He switched to daily finasteride in 11/15.         PSA: 3/10 3.55, 3/11 4.9, 11 5.76, 12 2.21,  2.22,  2.36, 4/15 1.5,  1.405, 10/17 1.31,  1.3,  1.2,  5.1 (UTI),  1.5,  2.2,  1.49,  2.0,  1.4        Past Medical History:   Diagnosis Date    CAD (coronary artery disease)     3 vessel nonobstructive, [facility]     Carotid stenosis, asymptomatic, right     less than 50%, Carotid U/S 2018 Amber    Chronic insomnia     Colon polyp     Diverticulosis     Dyslipidemia     Elevated coronary artery calcium score 2018        Elevated prostate specific antigen (PSA)     GERD (gastroesophageal reflux disease)     History of skin cancer     Hypertension     Hypertrophy of prostate without urinary obstruction and other lower urinary tract symptoms (LUTS)     Obesity, Class II, BMI 35-39.9     Prediabetes     UTI (urinary tract infection)      Past Surgical History:   Procedure Laterality Date    BUNIONECTOMY Left     CARDIAC CATHETERIZATION  10/2018    CHOLECYSTECTOMY, LAPAROSCOPIC      COLONOSCOPY  2013    diverticulosis    CYST REMOVAL Left     Knee    JOINT REPLACEMENT      Left Sholder    MOHS SURGERY Left     OTHER SURGICAL